# Patient Record
Sex: FEMALE | Race: WHITE | NOT HISPANIC OR LATINO
[De-identification: names, ages, dates, MRNs, and addresses within clinical notes are randomized per-mention and may not be internally consistent; named-entity substitution may affect disease eponyms.]

---

## 2018-04-27 ENCOUNTER — RESULT REVIEW (OUTPATIENT)
Age: 78
End: 2018-04-27

## 2018-04-30 PROBLEM — Z00.00 ENCOUNTER FOR PREVENTIVE HEALTH EXAMINATION: Status: ACTIVE | Noted: 2018-04-30

## 2018-05-15 ENCOUNTER — RECORD ABSTRACTING (OUTPATIENT)
Age: 78
End: 2018-05-15

## 2018-05-15 DIAGNOSIS — R93.8 ABNORMAL FINDINGS ON DIAGNOSTIC IMAGING OF OTHER SPECIFIED BODY STRUCTURES: ICD-10-CM

## 2018-05-15 DIAGNOSIS — Z86.79 PERSONAL HISTORY OF OTHER DISEASES OF THE CIRCULATORY SYSTEM: ICD-10-CM

## 2018-05-15 DIAGNOSIS — Z87.898 PERSONAL HISTORY OF OTHER SPECIFIED CONDITIONS: ICD-10-CM

## 2018-05-15 DIAGNOSIS — Z78.9 OTHER SPECIFIED HEALTH STATUS: ICD-10-CM

## 2018-05-15 DIAGNOSIS — Z85.3 PERSONAL HISTORY OF MALIGNANT NEOPLASM OF BREAST: ICD-10-CM

## 2018-05-15 RX ORDER — LISINOPRIL 20 MG/1
20 TABLET ORAL DAILY
Refills: 0 | Status: ACTIVE | COMMUNITY

## 2018-05-25 ENCOUNTER — APPOINTMENT (OUTPATIENT)
Dept: BREAST CENTER | Facility: HOSPITAL | Age: 78
End: 2018-05-25

## 2018-07-19 ENCOUNTER — APPOINTMENT (OUTPATIENT)
Dept: BREAST CENTER | Facility: CLINIC | Age: 78
End: 2018-07-19
Payer: MEDICARE

## 2018-07-19 VITALS
DIASTOLIC BLOOD PRESSURE: 89 MMHG | HEART RATE: 58 BPM | SYSTOLIC BLOOD PRESSURE: 147 MMHG | WEIGHT: 250 LBS | BODY MASS INDEX: 37.03 KG/M2 | HEIGHT: 69 IN

## 2018-07-19 PROCEDURE — 99214 OFFICE O/P EST MOD 30 MIN: CPT

## 2018-07-19 RX ORDER — METFORMIN HYDROCHLORIDE 500 MG/1
500 TABLET, COATED ORAL
Refills: 0 | Status: ACTIVE | COMMUNITY

## 2018-07-19 RX ORDER — DILTIAZEM HYDROCHLORIDE 360 MG/1
360 CAPSULE, EXTENDED RELEASE ORAL
Refills: 0 | Status: ACTIVE | COMMUNITY

## 2018-07-19 RX ORDER — APIXABAN 5 MG/1
5 TABLET, FILM COATED ORAL
Refills: 0 | Status: ACTIVE | COMMUNITY

## 2018-07-19 RX ORDER — ROSUVASTATIN CALCIUM 20 MG/1
20 TABLET, FILM COATED ORAL
Refills: 0 | Status: ACTIVE | COMMUNITY

## 2018-11-02 ENCOUNTER — APPOINTMENT (OUTPATIENT)
Dept: BREAST CENTER | Facility: HOSPITAL | Age: 78
End: 2018-11-02
Payer: MEDICARE

## 2018-11-02 PROCEDURE — 19301 PARTIAL MASTECTOMY: CPT | Mod: LT

## 2018-11-08 ENCOUNTER — APPOINTMENT (OUTPATIENT)
Dept: BREAST CENTER | Facility: CLINIC | Age: 78
End: 2018-11-08
Payer: MEDICARE

## 2018-11-08 DIAGNOSIS — Z12.31 ENCOUNTER FOR SCREENING MAMMOGRAM FOR MALIGNANT NEOPLASM OF BREAST: ICD-10-CM

## 2018-11-08 PROCEDURE — 99024 POSTOP FOLLOW-UP VISIT: CPT

## 2019-05-16 ENCOUNTER — APPOINTMENT (OUTPATIENT)
Dept: BREAST CENTER | Facility: CLINIC | Age: 79
End: 2019-05-16
Payer: MEDICARE

## 2019-05-16 VITALS
HEART RATE: 86 BPM | WEIGHT: 245 LBS | SYSTOLIC BLOOD PRESSURE: 151 MMHG | BODY MASS INDEX: 36.29 KG/M2 | DIASTOLIC BLOOD PRESSURE: 67 MMHG | HEIGHT: 69 IN

## 2019-05-16 DIAGNOSIS — Z87.898 PERSONAL HISTORY OF OTHER SPECIFIED CONDITIONS: ICD-10-CM

## 2019-05-16 PROCEDURE — 99214 OFFICE O/P EST MOD 30 MIN: CPT

## 2019-05-16 RX ORDER — AMLODIPINE BESYLATE 5 MG/1
5 TABLET ORAL DAILY
Refills: 0 | Status: DISCONTINUED | COMMUNITY
End: 2019-05-16

## 2019-05-16 RX ORDER — ASPIRIN 81 MG/1
81 TABLET, CHEWABLE ORAL DAILY
Refills: 0 | Status: DISCONTINUED | COMMUNITY
End: 2019-05-16

## 2019-05-16 NOTE — HISTORY OF PRESENT ILLNESS
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 ROS reviewed/discussed. Taking Vit D. Last Bone Densitometry (2/14): WNL\par Mammo/Sono (3/26/18): +7mm superf nodule L 1:00 N8 > Bx rec'ed

## 2019-05-16 NOTE — PHYSICAL EXAM
[Normocephalic] : normocephalic [Atraumatic] : atraumatic [Supple] : supple [No Supraclavicular Adenopathy] : no supraclavicular adenopathy [No Cervical Adenopathy] : no cervical adenopathy [Examined in the supine and seated position] : examined in the supine and seated position [No Thyromegaly] : no thyromegaly [No dominant masses] : no dominant masses in right breast  [No dominant masses] : no dominant masses left breast [No Nipple Retraction] : no left nipple retraction [No Nipple Discharge] : no left nipple discharge [No Axillary Lymphadenopathy] : no left axillary lymphadenopathy [No Edema] : no edema [No Rashes] : no rashes [No Ulceration] : no ulceration [de-identified] : PMX/SLN/RT. NER. %. No lymphedema. \par +stable retraction R LOQ [de-identified] : +A. Fib [de-identified] : PMX/Skin Tumor Exc/Ax/RT. NER. %. No lymphedema.

## 2019-05-16 NOTE — REVIEW OF SYSTEMS
[SOB on Exertion] : shortness of breath during exertion [Negative] : Heme/Lymph [FreeTextEntry5] : + A. Fib

## 2019-11-06 ENCOUNTER — RECORD ABSTRACTING (OUTPATIENT)
Age: 79
End: 2019-11-06

## 2019-11-06 DIAGNOSIS — Z92.21 PERSONAL HISTORY OF ANTINEOPLASTIC CHEMOTHERAPY: ICD-10-CM

## 2019-11-06 DIAGNOSIS — Z86.39 PERSONAL HISTORY OF OTHER ENDOCRINE, NUTRITIONAL AND METABOLIC DISEASE: ICD-10-CM

## 2019-11-25 ENCOUNTER — APPOINTMENT (OUTPATIENT)
Dept: RADIATION ONCOLOGY | Facility: CLINIC | Age: 79
End: 2019-11-25
Payer: MEDICARE

## 2019-11-25 PROCEDURE — 99213 OFFICE O/P EST LOW 20 MIN: CPT

## 2019-11-27 NOTE — PHYSICAL EXAM
[FreeTextEntry1] : This is a well developed, well nourished woman in no acute distress. She is awake, alert and oriented x3.  The patient appears stated age.  The skin in the previously irradiated area on the left cheek is smooth and well healed. THere is no nodularity or scaling, no evidence for residual or recurrent disease. The left nasal ala is notable for a 2-3 mm area of erythema, which may be the area of recent biopsy. There is no cervical or supraclavicular adenopathy bilaterally.\par

## 2019-11-27 NOTE — DATA REVIEWED
[FreeTextEntry1] : PATHOLOGIC DATA:\par Case No.: [id number] [facility] of Dermatopathology [facility]:  Left post nasal ala: Basal cell carcinoma

## 2019-11-27 NOTE — DISEASE MANAGEMENT
[Pathological] : TNM Stage: p [I] : I [FreeTextEntry4] : Basal cell carcinoma of the left upper cheek [TTNM] : 1 [NTNM] : 0 [MTNM] : 0 [de-identified] : 5500 cGy to the left medial cheek completed on 07/12/2019

## 2019-11-27 NOTE — HISTORY OF PRESENT ILLNESS
[FreeTextEntry1] : Since last being seen in our office for routine follow-up in July, Ms Woo has no new complaints regarding the previously irradiated area of her left cheek.   She mentions that she had a small lesion on her left nasal ala biopsied in September.  She was asked to see us to discuss radiotherapy to the area.  She is reluctant to commit to daily treatment and she would like to discuss this further with her dermatologist prior to discussing it here.  She reports that she is scheduled to see Dr Bland on or about December 10, 2019 for a follow-up.

## 2019-12-05 ENCOUNTER — APPOINTMENT (OUTPATIENT)
Dept: BREAST CENTER | Facility: CLINIC | Age: 79
End: 2019-12-05
Payer: MEDICARE

## 2019-12-05 VITALS
DIASTOLIC BLOOD PRESSURE: 77 MMHG | WEIGHT: 246 LBS | HEART RATE: 64 BPM | BODY MASS INDEX: 36.43 KG/M2 | SYSTOLIC BLOOD PRESSURE: 141 MMHG | HEIGHT: 69 IN

## 2019-12-05 PROCEDURE — 99214 OFFICE O/P EST MOD 30 MIN: CPT

## 2019-12-05 RX ORDER — LISINOPRIL 20 MG/1
20 TABLET ORAL
Refills: 0 | Status: DISCONTINUED | COMMUNITY
End: 2019-12-05

## 2019-12-05 RX ORDER — APIXABAN 5 MG/1
5 TABLET, FILM COATED ORAL
Refills: 0 | Status: DISCONTINUED | COMMUNITY
End: 2019-12-05

## 2019-12-05 RX ORDER — ROSUVASTATIN CALCIUM 20 MG/1
20 TABLET, FILM COATED ORAL DAILY
Refills: 0 | Status: DISCONTINUED | COMMUNITY
End: 2019-12-05

## 2019-12-05 RX ORDER — CHOLECALCIFEROL (VITAMIN D3) 25 MCG
TABLET ORAL
Refills: 0 | Status: DISCONTINUED | COMMUNITY
End: 2019-12-05

## 2019-12-05 NOTE — HISTORY OF PRESENT ILLNESS
[FreeTextEntry1] : S/P Wide Exc Skin Tumor (11/2/18): +3mm resid adnexal skin tumor, -margins\par S/P L Sono Core BX (4/20/18): +prob sweat gland tumor > comlete exc rec'ed\par S/P R PMX w/NL/SLN (10/30/06): +MF ILCA/LCIS, -0/1 LN, ER+, [place]+, Her 2 -\par R Stage IA ILCA\par S/P R Re-Exc (11/17/06): No resid Ca\par S/P R RT\par S/P Arimidex x 6 yrs\par S/P L PMX/AX/RT (1993, Abhijit)\par S/P tmx x 5 yrs\par S/P R Sono Core Bx (R 1:00, N3)(3/14/14): prob scar\par On eliquis for A. Fib\par No MH/FH changes. ROS reviewed/discussed. Taking Vit D. Last Bone Densitometry (2/14): WNL\par Mammo/Sono (3/27/19): FG, NSF

## 2019-12-05 NOTE — PHYSICAL EXAM
[Normocephalic] : normocephalic [No Supraclavicular Adenopathy] : no supraclavicular adenopathy [Supple] : supple [Atraumatic] : atraumatic [Normal Sinus Rhythm] : normal sinus rhythm [No Cervical Adenopathy] : no cervical adenopathy [No Thyromegaly] : no thyromegaly [Examined in the supine and seated position] : examined in the supine and seated position [No dominant masses] : no dominant masses in right breast  [No Nipple Retraction] : no left nipple retraction [No dominant masses] : no dominant masses left breast [No Nipple Discharge] : no left nipple discharge [No Axillary Lymphadenopathy] : no left axillary lymphadenopathy [No Rashes] : no rashes [No Edema] : no edema [No Ulceration] : no ulceration [de-identified] : S/P PMX/SLN/RT. NER. %. No lymphedema. \par +sig retraction R LOQ > stable [de-identified] : S/P PMX/Skin Tumor Exc/Ax/RT. NER. %. No lymphedema.

## 2020-04-27 ENCOUNTER — APPOINTMENT (OUTPATIENT)
Dept: RADIATION ONCOLOGY | Facility: CLINIC | Age: 80
End: 2020-04-27

## 2020-06-03 ENCOUNTER — APPOINTMENT (OUTPATIENT)
Dept: BREAST CENTER | Facility: CLINIC | Age: 80
End: 2020-06-03
Payer: MEDICARE

## 2020-06-03 VITALS — WEIGHT: 250 LBS | BODY MASS INDEX: 37.03 KG/M2 | HEIGHT: 69 IN

## 2020-06-03 PROCEDURE — 99214 OFFICE O/P EST MOD 30 MIN: CPT

## 2020-06-03 NOTE — PHYSICAL EXAM
[Normocephalic] : normocephalic [Atraumatic] : atraumatic [No Supraclavicular Adenopathy] : no supraclavicular adenopathy [Supple] : supple [No Cervical Adenopathy] : no cervical adenopathy [No Thyromegaly] : no thyromegaly [Normal Sinus Rhythm] : normal sinus rhythm [Examined in the supine and seated position] : examined in the supine and seated position [No dominant masses] : no dominant masses in right breast  [No dominant masses] : no dominant masses left breast [No Nipple Retraction] : no left nipple retraction [No Nipple Discharge] : no left nipple discharge [No Axillary Lymphadenopathy] : no left axillary lymphadenopathy [No Edema] : no edema [No Rashes] : no rashes [No Ulceration] : no ulceration [de-identified] : S/P PMX/Exc/Ax/RT. NER. %. No lymphedema.  [de-identified] : S/P PMX/SLN/RT. NER. %. No lymphedema.

## 2020-06-03 NOTE — HISTORY OF PRESENT ILLNESS
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 Fib\par No MH/FH changes. ROS reviewed/discussed. Taking Vit D. Last Bone Densitometry (2/14): WNL\par Mammo/Sono (3/27/19): FG, NSF

## 2020-12-02 ENCOUNTER — APPOINTMENT (OUTPATIENT)
Dept: BREAST CENTER | Facility: CLINIC | Age: 80
End: 2020-12-02
Payer: MEDICARE

## 2020-12-02 VITALS
HEIGHT: 69 IN | DIASTOLIC BLOOD PRESSURE: 78 MMHG | BODY MASS INDEX: 37.03 KG/M2 | HEART RATE: 71 BPM | WEIGHT: 250 LBS | SYSTOLIC BLOOD PRESSURE: 137 MMHG

## 2020-12-02 PROCEDURE — 99214 OFFICE O/P EST MOD 30 MIN: CPT

## 2020-12-02 PROCEDURE — 99072 ADDL SUPL MATRL&STAF TM PHE: CPT

## 2020-12-02 NOTE — HISTORY OF PRESENT ILLNESS
[FreeTextEntry1] : S/P Wide Exc Skin Tumor (11/2/18): +3mm resid adnexal skin tumor, -margins\par S/P L Sono Core BX (4/20/18): +prob sweat gland tumor > comlete exc rec'ed\par S/P R PMX w/NL/SLN (10/30/06): +MF ILCA/LCIS, -0/1 LN, ER+, [place]+, Her 2 -\par R Stage IA ILCA\par S/P R Re-Exc (11/17/06): No resid Ca\par S/P R RT\par S/P Arimidex x 6 yrs\par S/P L PMX/AX/RT (1993, Abhijit)\par S/P tmx x 5 yrs\par S/P R Sono Core Bx (R 1:00, N3)(3/14/14): prob scar\par On eliquis for A. Fib\par No MH/FH changes. ROS reviewed/discussed. Taking Vit D. Last Bone Densitometry (2/14): WNL\par Mammo/Sono (6/15/20): FG, NSF

## 2020-12-02 NOTE — PHYSICAL EXAM
[Normocephalic] : normocephalic [Atraumatic] : atraumatic [Supple] : supple [No Supraclavicular Adenopathy] : no supraclavicular adenopathy [No Cervical Adenopathy] : no cervical adenopathy [No Thyromegaly] : no thyromegaly [Examined in the supine and seated position] : examined in the supine and seated position [No dominant masses] : no dominant masses in right breast  [No dominant masses] : no dominant masses left breast [No Nipple Retraction] : no left nipple retraction [No Nipple Discharge] : no left nipple discharge [No Axillary Lymphadenopathy] : no left axillary lymphadenopathy [No Edema] : no edema [No Rashes] : no rashes [No Ulceration] : no ulceration [de-identified] : Yvonne KHAN s/t A. Fib

## 2021-07-08 ENCOUNTER — APPOINTMENT (OUTPATIENT)
Dept: BREAST CENTER | Facility: CLINIC | Age: 81
End: 2021-07-08
Payer: MEDICARE

## 2021-07-08 VITALS
WEIGHT: 240 LBS | HEART RATE: 84 BPM | DIASTOLIC BLOOD PRESSURE: 78 MMHG | BODY MASS INDEX: 35.55 KG/M2 | SYSTOLIC BLOOD PRESSURE: 158 MMHG | HEIGHT: 69 IN

## 2021-07-08 PROCEDURE — 99072 ADDL SUPL MATRL&STAF TM PHE: CPT

## 2021-07-08 PROCEDURE — 99214 OFFICE O/P EST MOD 30 MIN: CPT

## 2021-07-08 NOTE — HISTORY OF PRESENT ILLNESS
[FreeTextEntry1] : S/P Wide Exc Skin Tumor (11/2/18): +3mm resid adnexal skin tumor, -margins\par S/P L Sono Core BX (4/20/18): +prob sweat gland tumor > comlete exc rec'ed\par S/P R PMX w/NL/SLN (10/30/06): +MF ILCA/LCIS, -0/1 LN, ER+, [place]+, Her 2 -\par R Stage IA ILCA\par S/P R Re-Exc (11/17/06): No resid Ca\par S/P R RT\par S/P Arimidex x 6 yrs\par S/P L PMX/AX/RT (1993, Abhijit)\par S/P tmx x 5 yrs\par S/P R Sono Core Bx (R 1:00, N3)(3/14/14): prob scar\par On eliquis for A. Fib\par Got second Pfizer (3/21)(RUE)\par No MH/FH changes. ROS reviewed/discussed. Taking Vit D. Last Bone Densitometry (2/14): WNL\par Mammo/Sono (6/17/21): FG, NSF

## 2021-07-08 NOTE — PHYSICAL EXAM
[Normocephalic] : normocephalic [Atraumatic] : atraumatic [Supple] : supple [No Supraclavicular Adenopathy] : no supraclavicular adenopathy [No Cervical Adenopathy] : no cervical adenopathy [No Thyromegaly] : no thyromegaly [Normal Sinus Rhythm] : normal sinus rhythm [Examined in the supine and seated position] : examined in the supine and seated position [No dominant masses] : no dominant masses in right breast  [No dominant masses] : no dominant masses left breast [No Nipple Retraction] : no left nipple retraction [No Nipple Discharge] : no left nipple discharge [No Axillary Lymphadenopathy] : no left axillary lymphadenopathy [No Edema] : no edema [No Rashes] : no rashes [No Ulceration] : no ulceration [de-identified] : S/P PMX/SLN/RT. NER. %. No lymphedema.  [de-identified] : S/P PMX/Ax/RT/Exc Skin Tumor. NER. %. No lymphedema.

## 2022-01-19 ENCOUNTER — APPOINTMENT (OUTPATIENT)
Dept: BREAST CENTER | Facility: CLINIC | Age: 82
End: 2022-01-19
Payer: MEDICARE

## 2022-01-19 ENCOUNTER — NON-APPOINTMENT (OUTPATIENT)
Age: 82
End: 2022-01-19

## 2022-01-19 VITALS
HEART RATE: 84 BPM | BODY MASS INDEX: 34.07 KG/M2 | SYSTOLIC BLOOD PRESSURE: 134 MMHG | HEIGHT: 69 IN | WEIGHT: 230 LBS | DIASTOLIC BLOOD PRESSURE: 85 MMHG

## 2022-01-19 PROCEDURE — 99214 OFFICE O/P EST MOD 30 MIN: CPT

## 2022-01-19 NOTE — PHYSICAL EXAM
[Normocephalic] : normocephalic [Atraumatic] : atraumatic [Supple] : supple [No Supraclavicular Adenopathy] : no supraclavicular adenopathy [No Cervical Adenopathy] : no cervical adenopathy [No Thyromegaly] : no thyromegaly [Normal Sinus Rhythm] : normal sinus rhythm [Examined in the supine and seated position] : examined in the supine and seated position [No dominant masses] : no dominant masses in right breast  [No dominant masses] : no dominant masses left breast [No Nipple Retraction] : no left nipple retraction [No Nipple Discharge] : no left nipple discharge [No Axillary Lymphadenopathy] : no left axillary lymphadenopathy [No Edema] : no edema [No Rashes] : no rashes [No Ulceration] : no ulceration [de-identified] : S/P PMX/SLN/RT. NER. %. No lymphedema. \par +stable retraction/induration R LOQ c/w fat necrosis > observe [de-identified] : S/P PMX/Ax/Exc Skin Tumor/RT. NER. %. No lymphedema.

## 2022-01-19 NOTE — HISTORY OF PRESENT ILLNESS
[FreeTextEntry1] : S/P Wide Exc Skin Tumor (11/2/18): +3mm resid adnexal skin tumor, -margins\par S/P L Sono Core BX (4/20/18): +prob sweat gland tumor > comlete exc rec'ed\par S/P R PMX w/NL/SLN (10/30/06): +MF ILCA/LCIS, -0/1 LN, ER+, [place]+, Her 2 -\par R Stage IA ILCA\par S/P R Re-Exc (11/17/06): No resid Ca\par S/P R RT\par S/P Arimidex x 6 yrs\par S/P L PMX/AX/RT (1993, Abhijit)\par S/P tmx x 5 yrs\par S/P R Sono Core Bx (R 1:00, N3)(3/14/14): prob scar\par On eliquis for A. Fib\par Got Pfizer booster (10/21)(BETO)\par No MH/FH changes. ROS reviewed/discussed. Taking Vit D. Last Bone Densitometry (2/14): WNL\par Mammo/Sono (6/17/21): FG, NSF

## 2022-07-26 ENCOUNTER — APPOINTMENT (OUTPATIENT)
Dept: BREAST CENTER | Facility: CLINIC | Age: 82
End: 2022-07-26

## 2022-11-29 ENCOUNTER — APPOINTMENT (OUTPATIENT)
Dept: BREAST CENTER | Facility: CLINIC | Age: 82
End: 2022-11-29

## 2022-11-29 VITALS
HEART RATE: 81 BPM | DIASTOLIC BLOOD PRESSURE: 74 MMHG | WEIGHT: 225 LBS | SYSTOLIC BLOOD PRESSURE: 161 MMHG | BODY MASS INDEX: 33.33 KG/M2 | HEIGHT: 69 IN

## 2022-11-29 PROCEDURE — 99214 OFFICE O/P EST MOD 30 MIN: CPT

## 2022-11-29 NOTE — PHYSICAL EXAM
[Normocephalic] : normocephalic [Atraumatic] : atraumatic [Supple] : supple [No Supraclavicular Adenopathy] : no supraclavicular adenopathy [No Cervical Adenopathy] : no cervical adenopathy [No Thyromegaly] : no thyromegaly [Normal Sinus Rhythm] : normal sinus rhythm [Examined in the supine and seated position] : examined in the supine and seated position [No dominant masses] : no dominant masses in right breast  [No dominant masses] : no dominant masses left breast [No Nipple Retraction] : no left nipple retraction [No Nipple Discharge] : no left nipple discharge [No Axillary Lymphadenopathy] : no left axillary lymphadenopathy [No Edema] : no edema [No Rashes] : no rashes [No Ulceration] : no ulceration [de-identified] : S/P PMX/SLN/RT. NER. %. No lymphedema. \par +stable R LOQ induration/retraction c/w fat necrosis [de-identified] : S/P PMX/Ax/RT. NER. %. No lymphedema.

## 2022-11-29 NOTE — HISTORY OF PRESENT ILLNESS
[FreeTextEntry1] : S/P Wide Exc Skin Tumor (11/2/18): +3mm resid adnexal skin tumor, -margins\par S/P L Sono Core BX (4/20/18): +prob sweat gland tumor > comlete exc rec'ed\par S/P R PMX w/NL/SLN (10/30/06): +MF ILCA/LCIS, -0/1 LN, +margins, ER+, [place]+, Her 2 -\par R Stage IA ILCA\par S/P R Re-Exc (11/17/06): No resid Ca\par S/P R RT\par S/P Arimidex x 6 yrs\par S/P L PMX/AX/RT (1993, Abhijit)\par S/P tmx x 5 yrs\par S/P R Sono Core Bx (R 1:00, N3)(3/14/14): prob scar\par On eliquis for A. Fib\par Got Pfizer booster (10/21)(BETO)\par No MH/FH changes. ROS reviewed/discussed. Taking Vit D. Last Bone Densitometry (2/14): WNL\par Mammo/Sono (6/20/22): FG, NSF

## 2023-06-20 ENCOUNTER — RESULT REVIEW (OUTPATIENT)
Age: 83
End: 2023-06-20

## 2023-06-22 DIAGNOSIS — N63.20 UNSPECIFIED LUMP IN THE LEFT BREAST, UNSPECIFIED QUADRANT: ICD-10-CM

## 2023-07-05 ENCOUNTER — RESULT REVIEW (OUTPATIENT)
Age: 83
End: 2023-07-05

## 2023-07-18 ENCOUNTER — APPOINTMENT (OUTPATIENT)
Dept: BREAST CENTER | Facility: CLINIC | Age: 83
End: 2023-07-18
Payer: MEDICARE

## 2023-07-18 VITALS
WEIGHT: 210 LBS | HEIGHT: 69 IN | SYSTOLIC BLOOD PRESSURE: 121 MMHG | DIASTOLIC BLOOD PRESSURE: 65 MMHG | BODY MASS INDEX: 31.1 KG/M2 | HEART RATE: 80 BPM

## 2023-07-18 DIAGNOSIS — Z86.79 PERSONAL HISTORY OF OTHER DISEASES OF THE CIRCULATORY SYSTEM: ICD-10-CM

## 2023-07-18 DIAGNOSIS — Z85.3 PERSONAL HISTORY OF MALIGNANT NEOPLASM OF BREAST: ICD-10-CM

## 2023-07-18 DIAGNOSIS — R92.1 MAMMOGRAPHIC CALCIFICATION FOUND ON DIAGNOSTIC IMAGING OF BREAST: ICD-10-CM

## 2023-07-18 DIAGNOSIS — Z79.811 LONG TERM (CURRENT) USE OF AROMATASE INHIBITORS: ICD-10-CM

## 2023-07-18 PROCEDURE — 99214 OFFICE O/P EST MOD 30 MIN: CPT

## 2023-07-18 NOTE — PHYSICAL EXAM
[Normocephalic] : normocephalic [Atraumatic] : atraumatic [Supple] : supple [No Supraclavicular Adenopathy] : no supraclavicular adenopathy [No Cervical Adenopathy] : no cervical adenopathy [No Thyromegaly] : no thyromegaly [Normal Sinus Rhythm] : normal sinus rhythm [Examined in the supine and seated position] : examined in the supine and seated position [No dominant masses] : no dominant masses in right breast  [No dominant masses] : no dominant masses left breast [No Nipple Retraction] : no left nipple retraction [No Nipple Discharge] : no left nipple discharge [No Axillary Lymphadenopathy] : no left axillary lymphadenopathy [No Edema] : no edema [No Rashes] : no rashes [No Ulceration] : no ulceration [de-identified] : S/P PMX/SLN/RT. NER. %. No lymphedema. \par +stable retraction [de-identified] : S/P PMX/Ax/RT. NER. %. No lymphedema. \par +focal ecchymosis L LQI/L 3:00 c/t Bx sites

## 2023-07-18 NOTE — HISTORY OF PRESENT ILLNESS
[FreeTextEntry1] : S/P L Wide Exc Skin Tumor (11/2/18): +3mm resid adnexal skin tumor, -margins\par S/P L Sono Core BX (4/20/18): +prob sweat gland tumor > comlete exc rec'ed\par S/P R PMX w/NL/SLN (10/30/06): +MF ILCA/LCIS, -0/1 LN, +margins, ER+, [place]+, Her 2 -\par R Stage IA ILCA\par S/P R Re-Exc (11/17/06): No resid Ca\par S/P R RT\par S/P Arimidex x 6 yrs\par S/P L Sono Core Bx (7/6/23)(L 3:00 RA): +Inv Ca (ductal/lob features), SBR II, ER+, [place]+, Her2 2+, FISH: NON-Ampl, Ki67: 15-20%, +CIS (ductal/lob): high grade\par S/P L Stereot Bx (L LIQ)(7/6/23): Benign calc's\par S/P L PMX/AX/RT (1993, Abhijit)\par S/P tmx x 5 yrs\par S/P R Sono Core Bx (R 1:00, N3)(3/14/14): prob scar\par On eliquis for A. Fib\par Got Pfizer booster (10/21)(LUE)\par No MH/FH changes. ROS reviewed/discussed. Taking Vit D. Last Bone Densitometry (2/14): WNL\par Mammo/Sono (6/21/23): FG, +1cm susp mass L 3:00 RA, +pleom calc's L UOQ and L LIQ > Bx of L LIQ rec'ed

## 2023-08-17 ENCOUNTER — RESULT REVIEW (OUTPATIENT)
Age: 83
End: 2023-08-17

## 2023-08-18 ENCOUNTER — TRANSCRIPTION ENCOUNTER (OUTPATIENT)
Age: 83
End: 2023-08-18

## 2023-08-18 ENCOUNTER — RESULT REVIEW (OUTPATIENT)
Age: 83
End: 2023-08-18

## 2023-08-18 ENCOUNTER — APPOINTMENT (OUTPATIENT)
Dept: BREAST CENTER | Facility: HOSPITAL | Age: 83
End: 2023-08-18

## 2023-08-24 ENCOUNTER — APPOINTMENT (OUTPATIENT)
Dept: BREAST CENTER | Facility: CLINIC | Age: 83
End: 2023-08-24
Payer: MEDICARE

## 2023-08-24 PROCEDURE — 99024 POSTOP FOLLOW-UP VISIT: CPT

## 2023-08-24 NOTE — HISTORY OF PRESENT ILLNESS
[FreeTextEntry1] : S/P L PMX w/ NL (8/18/23): +1.1cm Ca w/ Ductal/Lob features, SBR III, +DCIS int/high grade, -LVI, -margins, ER+, [place]+, Her2 2+, FISH-, Ki67: 15-20% S/P L Wide Exc Skin Tumor (11/2/18): +3mm resid adnexal skin tumor, -margins S/P L Sono Core BX (4/20/18): +prob sweat gland tumor > comlete exc rec'ed S/P R PMX w/NL/SLN (10/30/06): +MF ILCA/LCIS, -0/1 LN, +margins, ER+, [place]+, Her 2 - R Stage IA ILCA S/P R Re-Exc (11/17/06): No resid Ca S/P R RT S/P Arimidex x 6 yrs S/P L Sono Core Bx (7/6/23)(L 3:00 RA): +Inv Ca (ductal/lob features), SBR II, ER+, [place]+, Her2 2+, FISH: NON-Ampl, Ki67: 15-20%, +CIS (ductal/lob): high grade S/P L Stereot Bx (L LIQ)(7/6/23): Benign calc's S/P L PMX/AX/RT (1993, Abhijit) S/P tmx x 5 yrs S/P R Sono Core Bx (R 1:00, N3)(3/14/14): prob scar On eliquis for A. Fib Got Pfizer booster (10/21)(LUE) No MH/FH changes. ROS reviewed/discussed. Taking Vit D. Last Bone Densitometry (2/14): WNL Mammo/Sono (6/21/23): FG, +1cm susp mass L 3:00 RA, +pleom calc's L UOQ and L LIQ > Bx of L LIQ rec'ed

## 2023-08-24 NOTE — PHYSICAL EXAM
[de-identified] : Pt w/o c/o Incision C&D. Min induration/ecchymosis. PDS d/c'ed. Stable. Pathology discussed.

## 2023-09-02 ENCOUNTER — NON-APPOINTMENT (OUTPATIENT)
Age: 83
End: 2023-09-02

## 2023-09-05 ENCOUNTER — RESULT REVIEW (OUTPATIENT)
Age: 83
End: 2023-09-05

## 2023-09-05 ENCOUNTER — APPOINTMENT (OUTPATIENT)
Dept: HEMATOLOGY ONCOLOGY | Facility: CLINIC | Age: 83
End: 2023-09-05
Payer: MEDICARE

## 2023-09-05 VITALS
BODY MASS INDEX: 33.43 KG/M2 | OXYGEN SATURATION: 92 % | DIASTOLIC BLOOD PRESSURE: 67 MMHG | HEART RATE: 96 BPM | WEIGHT: 213 LBS | TEMPERATURE: 97.8 F | SYSTOLIC BLOOD PRESSURE: 182 MMHG | RESPIRATION RATE: 18 BRPM | HEIGHT: 67 IN

## 2023-09-05 DIAGNOSIS — Z78.0 ASYMPTOMATIC MENOPAUSAL STATE: ICD-10-CM

## 2023-09-05 PROCEDURE — 99205 OFFICE O/P NEW HI 60 MIN: CPT | Mod: 25

## 2023-09-05 NOTE — CONSULT LETTER
[Dear  ___] : Dear  [unfilled], [Consult Letter:] : I had the pleasure of evaluating your patient, [unfilled]. [Please see my note below.] : Please see my note below. [Consult Closing:] : Thank you very much for allowing me to participate in the care of this patient.  If you have any questions, please do not hesitate to contact me. [Sincerely,] : Sincerely, [FreeTextEntry3] : Mis Epperson DO, FACJOSÉ MANUEL, FACP Medical Oncology and Hematology [facility] [DrBerto  ___] : Dr. EAGLE

## 2023-09-05 NOTE — PHYSICAL EXAM
[Fully active, able to carry on all pre-disease performance without restriction] : Status 0 - Fully active, able to carry on all pre-disease performance without restriction [Normal] : affect appropriate [de-identified] : deferred - patient prefers Dr. Murray to examine

## 2023-09-05 NOTE — ASSESSMENT
[FreeTextEntry1] : #Invasive Carcinoma (ductal/lobular features), ER+, PA+, Her2 2+, FISH: NON-Amplified, Ki67: 15-20%,  Patient has extensive history: Diagnosed in 1993 with breast cancer s/p L partial maastectomy, axillary dissection and radiation. Received Tamoxifen for 5 years. In 2006 developed ILC, -0/1 LN, +margins, ER+, PA+, Her 2 -  s/p R PMX w SLN and needed re-excision due to positive margins. She received radiation and was on arimadex for 6 years. Now with Invasive Carcinoma (ductal/lobular features), ER+, [place]+, Her2 2+, FISH: NON-Amplified, Ki67: 15-20%,  s/p L lumpectomy  +1.1cm Ca w/ Ductal/lobular features, grade III -LVI, -margins, ER+, [place]+, Her2 2+, FISH-, Ki67: 15-20% discussed role of oncotype - she does not want chemo thus will no order would recommend evaluation by Rad onc to discuss role of RT - appt Thursday with Dr. Marin Recommend endocrine therapy to lower estrogen. Check DEXA  to see if there is severe osteoporosis. If not would consider AI, letrozole 2.5 mg daily. Reviewed side effects. If there is severe osteoporosis would recommend tamoxifen again as it has been 20 + years since she has received it and I do believe she would benefit from ET.  #menopause  #afib on dilitazem and eliquis RTC in Nov/Dec - cbc with diff, cmp, Vit D

## 2023-09-05 NOTE — HISTORY OF PRESENT ILLNESS
[de-identified] : Ms. Mata is an 83 year old female with a PMH of afib, BCCa, HTN, HLD, DM, LCIS, R breast IDC that presents for initial consultation referred by Dr. Murray for new L sided IDC.   Oncological History:   She has been followed by Dr. Murray for many years.    was diagnosed with L breast cancer followed by Dr. Lacey s/p L lumpectomy, s/p RT and 5 years of Tamoxifen    s/p R breast bx revealing multifocal LCIS with pagetoid extension into the terminal ducts    s/p L breast bx revealing non proliferative fibrocystic changes with micro calcium deposition within atrophic terminal ductules and benign lobules with no evidence for atypia or malignancy.    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  LN negatve. She was then taken back 2006 for wide excision of R breast with no residual carcinoma present LCIS present  She then completed RT and 6 years of Arimidex.   2018 surgical consultation from bx? with possible sweat gland differentiation   2018 L wide excision of skin tumor revealing eccrine origin, prominent adenoid and baseal oid features in tumor. Tumor completely circumscribed in dermins of the skin and measure 3mm in greatest dimension. All surgical lines of resection free of tumor with wide margins (>1.0cm)   2019 s/p exision for L post nasal ALA wiht BCCA s/p RT   2023 s/p mammo/sono revealing At 3:00 in the left retroareolar/periareolar region a new highly suspicious mass with associated color flow correlating with an irregular mammographic mass at 2-3 o'clock is present. Ultrasound-guided biopsy is necessary at the current time to determine histology. Fine pleomorphic microcalcifications are present at the upper outer aspect of the left breast and the inner lower aspect of the left breast. Representative stereotactic biopsy of the inner lower quadrant calcifications is recommended at the current time to determine histology and evaluate for malignancy with additional stereotactic biopsy of upper outer quadrant calcifications at a later date, if this would change clinical management.  2023 s/p L breast bx revealing invasive carcinoma with mixed ductal and lobular features involving up to 5 cores up to 11mm in single core, grade 7/9 with signet ring cells ER/AZ+ HER2 equivocal negative by FISH, Ki67 15-20%   2023 s/p L lumpectomy revealing Invasive carcinoma with ductal and lobular features, histologic grade 3 (tubules 3, nuclei 3, mitoses 2) spanning up to 11 mm in greatest dimension. Rare signet ring cell features are seen. Ductal carcinoma in situ (DCIS), intermediate to high nuclear grade, solid and cribriform pattern, minor component. Perineural invasion is seen. No angiolymphatic invasion seen. Invasive carcinoma is seen at the medial edge in multiple foci and is 4 mm from the anterior edge of the specimen. ER/AZ + HER2 negative Ki 67 15-20%   Breast Cancer Risk Factors: Menarche: 10 Date of LMP: age 43 Menopause: age 43  Age at first live birth: 30s Nursed: no Hysterectomy: no Oophorectomy: yes OCP: few months  HRT: no Last pap/pelvic exam: never abnl PAP, has not seen GYN in 10 years Related family history none  Ashkenazi: no BRCA testing: no

## 2023-09-07 ENCOUNTER — APPOINTMENT (OUTPATIENT)
Dept: RADIATION ONCOLOGY | Facility: CLINIC | Age: 83
End: 2023-09-07
Payer: MEDICARE

## 2023-09-07 VITALS
TEMPERATURE: 97 F | RESPIRATION RATE: 16 BRPM | OXYGEN SATURATION: 98 % | DIASTOLIC BLOOD PRESSURE: 71 MMHG | WEIGHT: 213 LBS | BODY MASS INDEX: 33.43 KG/M2 | HEART RATE: 71 BPM | SYSTOLIC BLOOD PRESSURE: 124 MMHG | HEIGHT: 67 IN

## 2023-09-07 DIAGNOSIS — Z78.9 OTHER SPECIFIED HEALTH STATUS: ICD-10-CM

## 2023-09-07 PROCEDURE — 99204 OFFICE O/P NEW MOD 45 MIN: CPT | Mod: 25

## 2023-09-07 NOTE — PHYSICAL EXAM
[Symmetric] : breasts are symmetric [Breast Palpation Mass] : no palpable masses [Breast Abnormal Lactation (Galactorrhea)] : no nipple discharge [No UE Edema] : there is no upper extremity edema [Normal] : oriented to person, place and time, the affect was normal, the mood was normal and not anxious [de-identified] : Well-healing lumpectomy scar on left side with no underlying induration.  No other lumps palpable.  No lymph node palpable in the left axilla.

## 2023-09-07 NOTE — HISTORY OF PRESENT ILLNESS
[FreeTextEntry1] : Ms. Mata is an 83-year-old female who presents today for a newly diagnosed Left Breast Cancer.  Oncological History: She has been followed by Dr. Murray for many years.  1993 was diagnosed with L breast cancer followed by Dr. Lacey s/p L lumpectomy, s/p RT ([facility]) and 5 years of Tamoxifen. 2000 s/p R breast bx revealing multifocal LCIS with pagetoid extension into the terminal ducts. 2004 s/p L breast bx revealing non proliferative fibrocystic changes with micro calcium deposition within atrophic terminal ductulus and benign lobules with no evidence for atypia or malignancy. 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 LN negative. She was then taken back 11/2006 for wide excision of R breast with no residual carcinoma present LCIS present.  She then completed RT  ( [place]) and 6 years of Arimidex.  11/2018 L wide excision of skin tumor, Left breast:  revealing eccrine origin, prominent adenoid and baseal oid features in tumor. Tumor completely circumscribed in dermis of the skin and measure 3mm in greatest dimension. All surgical lines of resection free of tumor with wide margins (>1.0cm)  9/2019 s/p excision for L postnasal ALA with BCCA . She did complete 5500 cGy to the left medial cheek on 7/12/2019.  6/21/2023 Mammo/Sono ([facility]) revealing at 3:00 in the left retro areolar/payal areolar region a new highly suspicious mass with associated color flow correlating with an irregular mammographic mass at 2-3 o'clock is present. Ultrasound-guided biopsy is necessary at the current time to determine histology. Fine pleomorphic microcalcifications are present at the upper outer aspect of the left breast and the inner lower aspect of the left breast. Representative stereotactic biopsy of the inner lower quadrant calcifications is recommended at the current time to determine histology and evaluate for malignancy with additional stereotactic biopsy of upper outer quadrant calcifications at a later date, if this would change clinical management.  7/5/2023 L breast bx revealing invasive carcinoma with mixed ductal and lobular features involving up to 5 cores up to 11mm in single core, grade 7/9 with signet ring cells ER/VT+ HER2 equivocal negative by FISH, Ki67 15-20%  8/18/2023 L lumpectomy revealing Invasive carcinoma with ductal and lobular features, histologic grade 3 (tubules 3, nuclei 3, mitoses 2) spanning up to 11 mm in greatest dimension. Rare signet ring cell features are seen. Ductal carcinoma in situ (DCIS), intermediate to high nuclear grade, solid and cribriform pattern, minor component. Perineural invasion is seen. No angiolymphatic invasion seen. Invasive carcinoma is seen at the medial edge in multiple foci and is 4 mm from the anterior edge of the specimen. ER/VT + HER2 negative Ki 67 15-20%  Sheis overall feeling well. She does have some left breast heaviness. She saw Dr. Epperson on 9/5/2023 adn she is ordering a BMD.

## 2023-09-07 NOTE — VITALS
[Maximal Pain Intensity: 0/10] : 0/10 [Least Pain Intensity: 0/10] : 0/10 [NoTreatment Scheduled] : no treatment scheduled [80: Normal activity with effort; some signs or symptoms of disease.] : 80: Normal activity with effort; some signs or symptoms of disease.  [Date: ____________] : Patient's last distress assessment performed on [unfilled]. [0 - No Distress] : Distress Level: 0

## 2023-09-12 ENCOUNTER — APPOINTMENT (OUTPATIENT)
Dept: BREAST CENTER | Facility: CLINIC | Age: 83
End: 2023-09-12
Payer: MEDICARE

## 2023-09-12 ENCOUNTER — RESULT REVIEW (OUTPATIENT)
Age: 83
End: 2023-09-12

## 2023-09-12 PROCEDURE — 19000 PUNCTURE ASPIR CYST BREAST: CPT | Mod: LT,78

## 2023-09-14 ENCOUNTER — APPOINTMENT (OUTPATIENT)
Dept: BREAST CENTER | Facility: CLINIC | Age: 83
End: 2023-09-14
Payer: MEDICARE

## 2023-09-14 PROCEDURE — 19020 MASTOTOMY EXPL DRG ABSC DP: CPT | Mod: LT,78

## 2023-09-18 ENCOUNTER — APPOINTMENT (OUTPATIENT)
Dept: BREAST CENTER | Facility: CLINIC | Age: 83
End: 2023-09-18
Payer: MEDICARE

## 2023-09-18 PROCEDURE — 99024 POSTOP FOLLOW-UP VISIT: CPT

## 2023-09-26 ENCOUNTER — APPOINTMENT (OUTPATIENT)
Dept: BREAST CENTER | Facility: HOSPITAL | Age: 83
End: 2023-09-26
Payer: MEDICARE

## 2023-09-26 PROCEDURE — 99024 POSTOP FOLLOW-UP VISIT: CPT

## 2023-10-03 ENCOUNTER — APPOINTMENT (OUTPATIENT)
Dept: BREAST CENTER | Facility: CLINIC | Age: 83
End: 2023-10-03
Payer: MEDICARE

## 2023-10-03 PROCEDURE — 99213 OFFICE O/P EST LOW 20 MIN: CPT | Mod: 24

## 2023-10-10 ENCOUNTER — APPOINTMENT (OUTPATIENT)
Dept: BREAST CENTER | Facility: CLINIC | Age: 83
End: 2023-10-10
Payer: MEDICARE

## 2023-10-10 PROCEDURE — 99024 POSTOP FOLLOW-UP VISIT: CPT

## 2023-11-13 ENCOUNTER — NON-APPOINTMENT (OUTPATIENT)
Age: 83
End: 2023-11-13

## 2023-11-13 VITALS
SYSTOLIC BLOOD PRESSURE: 155 MMHG | DIASTOLIC BLOOD PRESSURE: 77 MMHG | OXYGEN SATURATION: 96 % | RESPIRATION RATE: 16 BRPM | HEART RATE: 70 BPM

## 2023-11-28 ENCOUNTER — APPOINTMENT (OUTPATIENT)
Dept: HEMATOLOGY ONCOLOGY | Facility: CLINIC | Age: 83
End: 2023-11-28

## 2023-12-05 ENCOUNTER — APPOINTMENT (OUTPATIENT)
Dept: BREAST CENTER | Facility: CLINIC | Age: 83
End: 2023-12-05
Payer: MEDICARE

## 2023-12-05 VITALS
WEIGHT: 210 LBS | DIASTOLIC BLOOD PRESSURE: 92 MMHG | HEIGHT: 69 IN | HEART RATE: 66 BPM | SYSTOLIC BLOOD PRESSURE: 151 MMHG | BODY MASS INDEX: 31.1 KG/M2

## 2023-12-05 DIAGNOSIS — N61.1 ABSCESS OF THE BREAST AND NIPPLE: ICD-10-CM

## 2023-12-05 DIAGNOSIS — C44.319 BASAL CELL CARCINOMA OF SKIN OF OTHER PARTS OF FACE: ICD-10-CM

## 2023-12-05 PROCEDURE — 99214 OFFICE O/P EST MOD 30 MIN: CPT

## 2023-12-13 NOTE — CONSULT LETTER
[FreeTextEntry3] : Mis Epperson DO, FACJOSÉ MANUEL, FACP Medical Oncology and Hematology [facility]

## 2023-12-19 ENCOUNTER — RESULT REVIEW (OUTPATIENT)
Age: 83
End: 2023-12-19

## 2023-12-19 ENCOUNTER — APPOINTMENT (OUTPATIENT)
Dept: HEMATOLOGY ONCOLOGY | Facility: CLINIC | Age: 83
End: 2023-12-19
Payer: MEDICARE

## 2023-12-19 PROCEDURE — 99213 OFFICE O/P EST LOW 20 MIN: CPT | Mod: 25

## 2023-12-26 NOTE — HISTORY OF PRESENT ILLNESS
[de-identified] : Ms. Mata is an 83 year old female with a PMH of afib, BCCa, HTN, HLD, DM, LCIS, R breast IDC that presents for initial consultation referred by Dr. Murray for new L sided IDC.   Oncological History:   She has been followed by Dr. Murray for many years.    was diagnosed with L breast cancer followed by Dr. Lacey s/p L lumpectomy, s/p RT and 5 years of Tamoxifen    s/p R breast bx revealing multifocal LCIS with pagetoid extension into the terminal ducts    s/p L breast bx revealing non proliferative fibrocystic changes with micro calcium deposition within atrophic terminal ductules and benign lobules with no evidence for atypia or malignancy.    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  LN negatve. She was then taken back 2006 for wide excision of R breast with no residual carcinoma present LCIS present  She then completed RT and 6 years of Arimidex.   2018 surgical consultation from bx? with possible sweat gland differentiation   2018 L wide excision of skin tumor revealing eccrine origin, prominent adenoid and baseal oid features in tumor. Tumor completely circumscribed in dermins of the skin and measure 3mm in greatest dimension. All surgical lines of resection free of tumor with wide margins (>1.0cm)   2019 s/p exision for L post nasal ALA wiht BCCA s/p RT   2023 s/p mammo/sono revealing At 3:00 in the left retroareolar/periareolar region a new highly suspicious mass with associated color flow correlating with an irregular mammographic mass at 2-3 o'clock is present. Ultrasound-guided biopsy is necessary at the current time to determine histology. Fine pleomorphic microcalcifications are present at the upper outer aspect of the left breast and the inner lower aspect of the left breast. Representative stereotactic biopsy of the inner lower quadrant calcifications is recommended at the current time to determine histology and evaluate for malignancy with additional stereotactic biopsy of upper outer quadrant calcifications at a later date, if this would change clinical management.  2023 s/p L breast bx revealing invasive carcinoma with mixed ductal and lobular features involving up to 5 cores up to 11mm in single core, grade 7/9 with signet ring cells ER/MT+ HER2 equivocal negative by FISH, Ki67 15-20%   2023 s/p L lumpectomy revealing Invasive carcinoma with ductal and lobular features, histologic grade 3 (tubules 3, nuclei 3, mitoses 2) spanning up to 11 mm in greatest dimension. Rare signet ring cell features are seen. Ductal carcinoma in situ (DCIS), intermediate to high nuclear grade, solid and cribriform pattern, minor component. Perineural invasion is seen. No angiolymphatic invasion seen. Invasive carcinoma is seen at the medial edge in multiple foci and is 4 mm from the anterior edge of the specimen. ER/MT + HER2 negative Ki 67 15-20%   Breast Cancer Risk Factors: Menarche: 10 Date of LMP: age 43 Menopause: age 43  Age at first live birth: 30s Nursed: no Hysterectomy: no Oophorectomy: yes OCP: few months  HRT: no Last pap/pelvic exam: never abnl PAP, has not seen GYN in 10 years Related family history none  Ashkenazi: no BRCA testing: no   [de-identified] : Patient seen and examined today for routine follow up for the management of breast cancer. She is s/p completion of RT and has follow up 12/28/23. She started on Letrozole following RT on 11/28/23. She has been tolerating. She notes feeling fatigued. She has been taking Ca++ and vit D. She continues to have chronic low back pain s/p injections with Dr. Navarro. s/p follow up with Dr. Murray 12/2023. s/p DEXA 9/2023 revealing osteopenia.

## 2023-12-26 NOTE — ASSESSMENT
[FreeTextEntry1] : #Breast Cancer - Invasive Carcinoma (ductal/lobular features), ER+, CA+, Her2 2+, FISH: NON-Amplified, Ki67: 15-20%, Patient has extensive history: Diagnosed in 1993 with breast cancer s/p L partial maastectomy, axillary dissection and radiation. Received Tamoxifen for 5 years. In 2006 developed ILC, -0/1 LN, +margins, ER+, CA+, Her 2 - s/p R PMX w SLN and needed re-excision due to positive margins. She received radiation and was on arimadex for 6 years.  - Now with Invasive Carcinoma (ductal/lobular features), ER+, CA+, Her2 2+, FISH: NON-Amplified, Ki67: 15-20%, - s/p L lumpectomy +1.1cm Ca w/ Ductal/lobular features, grade III -LVI, -margins, ER+, CA+, Her2 2+, FISH-, Ki67: 15-20% - Discussed role of oncotype - she does not want chemo thus will no order - Would recommend evaluation by Rad onc to discuss role of RT - appt Thursday with Dr. Marin - Recommend endocrine therapy to lower estrogen. Check DEXA to see if there is severe osteoporosis. If not would consider AI, letrozole 2.5 mg daily. Reviewed side effects. If there is severe osteoporosis would recommend tamoxifen again as it has been 20 + years since she has received it and I do believe she would benefit from ET. - 12/19/23 vs and CBC reviewed; WBC 11.01, hgb 13.3, plt 252. s/p completion of RT. Has follow up with Rad Onc next week for PTE. s/p follow up with Dr. Murray 12/2023, note reviewed. Started Letrozole 11/28/23 tolerating well. Continue. Monitor leukocytosis. No s/s infection   #Back Pain  - Sees Dr. Navarro s/p injections   #Osteopenia  - s/p DEXA 9/2023 revealing L spine -0.2, L hi -1.3, L femoral necl -1.4, R hip -1.1, R femoral neck -1.0 - On Letrozole  - Ca++ and vit D   #Afib - On dilitazem and eliquis  RTC in 3 mos with CBC with diff, CMP, vit D

## 2023-12-28 ENCOUNTER — APPOINTMENT (OUTPATIENT)
Dept: RADIATION ONCOLOGY | Facility: CLINIC | Age: 83
End: 2023-12-28
Payer: MEDICARE

## 2023-12-28 VITALS
OXYGEN SATURATION: 98 % | RESPIRATION RATE: 16 BRPM | SYSTOLIC BLOOD PRESSURE: 140 MMHG | HEART RATE: 89 BPM | DIASTOLIC BLOOD PRESSURE: 72 MMHG | TEMPERATURE: 98 F

## 2023-12-28 PROCEDURE — 99024 POSTOP FOLLOW-UP VISIT: CPT

## 2023-12-28 RX ORDER — CEPHALEXIN 500 MG/1
500 CAPSULE ORAL 4 TIMES DAILY
Qty: 28 | Refills: 0 | Status: DISCONTINUED | COMMUNITY
Start: 2023-09-12 | End: 2023-12-28

## 2023-12-28 NOTE — DISEASE MANAGEMENT
[Pathological] : TNM Stage: p [I] : I [TTNM] : 1 [NTNM] : 0 [MTNM] : 0 [de-identified] : 5/5Fx to left breast to a dose of 30Gy on 11/8/2023.

## 2023-12-28 NOTE — HISTORY OF PRESENT ILLNESS
[FreeTextEntry1] :  Ms. DYE is a 83-year-old female diagnosed with Invasive carcinoma of the left breast. She has completed 5/5 fractions to a total dose of 1800 cGy on 11/8/2023.  12/28/23  PTE today.  She is now s/p RT to the Left partial breast 5 Fxs to 30 Gy finished on 11/8/23. She had a follow up with Dr. Murray in early December and will see him again in 3/2024 with breast imaging in 6/2024. She saw Dr. Epperson on 12/18/2023 and is taking Letrozole 2.5 mg daily with no side effects.  She is overall feeling well and denies any pain, skin issues or bone pain.  .

## 2023-12-28 NOTE — REASON FOR VISIT
[Routine On-Treatment] : a routine on-treatment visit for [Breast Cancer] : breast cancer [Post-Treatment Evaluation] : post-treatment evaluation for

## 2023-12-28 NOTE — PHYSICAL EXAM
[Breast Appearance] : normal in appearance [Breast Palpation Mass] : no palpable masses [No UE Edema] : there is no upper extremity edema [Normal] : oriented to person, place and time, the affect was normal, the mood was normal and not anxious [de-identified] : Well-healed lumpectomy scar on the left side with minimal induration.  No hyperpigmentation noted.  No lymph node palpable in left axilla

## 2024-03-19 ENCOUNTER — APPOINTMENT (OUTPATIENT)
Dept: BREAST CENTER | Facility: CLINIC | Age: 84
End: 2024-03-19
Payer: MEDICARE

## 2024-03-19 VITALS — BODY MASS INDEX: 31.1 KG/M2 | WEIGHT: 210 LBS | HEIGHT: 69 IN

## 2024-03-19 DIAGNOSIS — Z92.3 PERSONAL HISTORY OF IRRADIATION: ICD-10-CM

## 2024-03-19 DIAGNOSIS — N64.1 FAT NECROSIS OF BREAST: ICD-10-CM

## 2024-03-19 DIAGNOSIS — L98.9 DISORDER OF THE SKIN AND SUBCUTANEOUS TISSUE, UNSPECIFIED: ICD-10-CM

## 2024-03-19 DIAGNOSIS — D49.2 NEOPLASM OF UNSPECIFIED BEHAVIOR OF BONE, SOFT TISSUE, AND SKIN: ICD-10-CM

## 2024-03-19 PROCEDURE — 99214 OFFICE O/P EST MOD 30 MIN: CPT

## 2024-03-19 NOTE — HISTORY OF PRESENT ILLNESS
[FreeTextEntry1] : S/P L PMX w/ NL (8/18/23): +1.1cm Ca w/ Ductal/Lob features, SBR III, +DCIS int/high grade, -LVI, -margins, ER+, MT+, Her2 2+, FISH-, Ki67: 15-20% S/P L Wide Exc Skin Tumor (11/2/18): +3mm resid adnexal skin tumor, -margins S/P L Sono Core BX (4/20/18): +prob sweat gland tumor > comlete exc rec'ed S/P R PMX w/NL/SLN (10/30/06): +MF ILCA/LCIS, -0/1 LN, +margins, ER+, MT+, Her 2 - R Stage IA ILCA S/P R Re-Exc (11/17/06): No resid Ca S/P R RT S/P Arimidex x 6 yrs S/P L Sono Core Bx (7/6/23)(L 3:00 RA): +Inv Ca (ductal/lob features), SBR II, ER+, MT+, Her2 2+, FISH: NON-Ampl, Ki67: 15-20%, +CIS (ductal/lob): high grade S/P L Stereot Bx (L LIQ)(7/6/23): Benign calc's S/P L PMX/AX/RT (1993, Abhijit) S/P tmx x 5 yrs S/P R Sono Core Bx (R 1:00, N3)(3/14/14): prob scar On eliquis for A. Fib Got Pfizer booster (10/21)(LUE) No MH/FH changes. ROS reviewed/discussed. Taking Vit D. Last Bone Densitometry (2/14): WNL Mammo/Sono (6/21/23): FG, +1cm susp mass L 3:00 RA, +pleom calc's L UOQ and L LIQ > Bx of L LIQ rec'ed

## 2024-03-19 NOTE — PHYSICAL EXAM
[Normocephalic] : normocephalic [Atraumatic] : atraumatic [Supple] : supple [No Supraclavicular Adenopathy] : no supraclavicular adenopathy [No Cervical Adenopathy] : no cervical adenopathy [No Thyromegaly] : no thyromegaly [Normal Sinus Rhythm] : normal sinus rhythm [Examined in the supine and seated position] : examined in the supine and seated position [No dominant masses] : no dominant masses left breast [No dominant masses] : no dominant masses in right breast  [No Nipple Retraction] : no left nipple retraction [No Nipple Discharge] : no left nipple discharge [No Axillary Lymphadenopathy] : no left axillary lymphadenopathy [No Edema] : no edema [No Rashes] : no rashes [No Ulceration] : no ulceration [de-identified] : S/P PMX/SLN/RT. NER. %. No lymphedema.  +R 9:00 th w/ retraction c/w fat necrosis  [de-identified] : S/P Re-PMX/Re-RT/(prior PMX/Ax/RT). NER. %. No lymphedema.  +induration/retraction

## 2024-04-09 ENCOUNTER — RESULT REVIEW (OUTPATIENT)
Age: 84
End: 2024-04-09

## 2024-04-09 ENCOUNTER — APPOINTMENT (OUTPATIENT)
Dept: HEMATOLOGY ONCOLOGY | Facility: CLINIC | Age: 84
End: 2024-04-09
Payer: MEDICARE

## 2024-04-09 VITALS
RESPIRATION RATE: 16 BRPM | HEIGHT: 69 IN | TEMPERATURE: 97.8 F | WEIGHT: 209 LBS | BODY MASS INDEX: 30.96 KG/M2 | DIASTOLIC BLOOD PRESSURE: 75 MMHG | OXYGEN SATURATION: 92 % | HEART RATE: 102 BPM | SYSTOLIC BLOOD PRESSURE: 154 MMHG

## 2024-04-09 DIAGNOSIS — C50.911 MALIGNANT NEOPLASM OF UNSPECIFIED SITE OF RIGHT FEMALE BREAST: ICD-10-CM

## 2024-04-09 DIAGNOSIS — I48.20 CHRONIC ATRIAL FIBRILLATION, UNSP: ICD-10-CM

## 2024-04-09 DIAGNOSIS — C50.912 MALIGNANT NEOPLASM OF UNSPECIFIED SITE OF RIGHT FEMALE BREAST: ICD-10-CM

## 2024-04-09 PROCEDURE — 99214 OFFICE O/P EST MOD 30 MIN: CPT

## 2024-04-09 RX ORDER — LETROZOLE TABLETS 2.5 MG/1
2.5 TABLET, FILM COATED ORAL DAILY
Qty: 90 | Refills: 3 | Status: ACTIVE | COMMUNITY
Start: 2023-11-21 | End: 1900-01-01

## 2024-04-09 NOTE — REVIEW OF SYSTEMS
[Joint Pain] : joint pain [Negative] : Allergic/Immunologic [Constipation] : constipation [FreeTextEntry8] : drop bladder [FreeTextEntry9] : +back pain

## 2024-04-09 NOTE — PHYSICAL EXAM
[Fully active, able to carry on all pre-disease performance without restriction] : Status 0 - Fully active, able to carry on all pre-disease performance without restriction [Normal] : affect appropriate [de-identified] : bilateral breasts with R and L retraction from surgeries +scar tissue, no LAD

## 2024-04-09 NOTE — HISTORY OF PRESENT ILLNESS
[de-identified] : Ms. Mata is an 83 year old female with a PMH of afib, BCCa, HTN, HLD, DM, LCIS, R breast IDC that presents for initial consultation referred by Dr. Murray for new L sided IDC.   Oncological History:   She has been followed by Dr. Murray for many years.    was diagnosed with L breast cancer followed by Dr. Lacey s/p L lumpectomy, s/p RT and 5 years of Tamoxifen    s/p R breast bx revealing multifocal LCIS with pagetoid extension into the terminal ducts    s/p L breast bx revealing non proliferative fibrocystic changes with micro calcium deposition within atrophic terminal ductules and benign lobules with no evidence for atypia or malignancy.    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  LN negatve. She was then taken back 2006 for wide excision of R breast with no residual carcinoma present LCIS present  She then completed RT and 6 years of Arimidex.   2018 surgical consultation from bx? with possible sweat gland differentiation   2018 L wide excision of skin tumor revealing eccrine origin, prominent adenoid and baseal oid features in tumor. Tumor completely circumscribed in dermins of the skin and measure 3mm in greatest dimension. All surgical lines of resection free of tumor with wide margins (>1.0cm)   2019 s/p exision for L post nasal ALA wiht BCCA s/p RT   2023 s/p mammo/sono revealing At 3:00 in the left retroareolar/periareolar region a new highly suspicious mass with associated color flow correlating with an irregular mammographic mass at 2-3 o'clock is present. Ultrasound-guided biopsy is necessary at the current time to determine histology. Fine pleomorphic microcalcifications are present at the upper outer aspect of the left breast and the inner lower aspect of the left breast. Representative stereotactic biopsy of the inner lower quadrant calcifications is recommended at the current time to determine histology and evaluate for malignancy with additional stereotactic biopsy of upper outer quadrant calcifications at a later date, if this would change clinical management.  2023 s/p L breast bx revealing invasive carcinoma with mixed ductal and lobular features involving up to 5 cores up to 11mm in single core, grade 7/9 with signet ring cells ER/TX+ HER2 equivocal negative by FISH, Ki67 15-20%   2023 s/p L lumpectomy revealing Invasive carcinoma with ductal and lobular features, histologic grade 3 (tubules 3, nuclei 3, mitoses 2) spanning up to 11 mm in greatest dimension. Rare signet ring cell features are seen. Ductal carcinoma in situ (DCIS), intermediate to high nuclear grade, solid and cribriform pattern, minor component. Perineural invasion is seen. No angiolymphatic invasion seen. Invasive carcinoma is seen at the medial edge in multiple foci and is 4 mm from the anterior edge of the specimen. ER/TX + HER2 negative Ki 67 15-20%   Breast Cancer Risk Factors: Menarche: 10 Date of LMP: age 43 Menopause: age 43  Age at first live birth: 30s Nursed: no Hysterectomy: no Oophorectomy: yes OCP: few months  HRT: no Last pap/pelvic exam: never abnl PAP, has not seen GYN in 10 years Related family history none  Ashkenazi: no BRCA testing: no   [de-identified] : Patient seen and examined today for routine follow up for the management of breast cancer.   She started on Letrozole following RT on 11/28/23.  Tolerating letrozole well with no complaints  She has been taking Ca++ and vit D.  She continues to have chronic low back pain s/p injections with Dr. Navarro.  s/p follow up with Dr. Murray  3/24- note reviewed 3/2024 CT neck and found to have blockage in carotid artery. Vascular Sx following Dr. Rasheed. Patient to have repeat scan in one yesr  DEXA 9/2023 revealing osteopenia  Mammo 8/2023 - due again 7/2/24

## 2024-04-09 NOTE — ASSESSMENT
[With Patient/Caregiver] : With Patient/Caregiver [Designated Health Care Proxy] : Designated Health Care Proxy [Name: ___] : Name: [unfilled] [Relationship: ___] : Relationship: [unfilled] [FreeTextEntry1] : #Breast Cancer - Invasive Carcinoma (ductal/lobular features), ER+, UT+, Her2 2+, FISH: NON-Amplified, Ki67: 15-20%, Patient has extensive history: Diagnosed in 1993 with breast cancer s/p L partial maastectomy, axillary dissection and radiation. Received Tamoxifen for 5 years. In 2006 developed ILC, -0/1 LN, +margins, ER+, UT+, Her 2 - s/p R PMX w SLN and needed re-excision due to positive margins. She received radiation and was on arimadex for 6 years.  - Now with Invasive Carcinoma (ductal/lobular features), ER+, UT+, Her2 2+, FISH: NON-Amplified, Ki67: 15-20%, - s/p L lumpectomy +1.1cm Ca w/ Ductal/lobular features, grade III -LVI, -margins, ER+, UT+, Her2 2+, FISH-, Ki67: 15-20% - Discussed role of oncotype - she does not want chemo thus will no order - Would recommend evaluation by Rad onc to discuss role of RT - appt Thursday with Dr. Marin - Recommend endocrine therapy to lower estrogen. Check DEXA to see if there is severe osteoporosis. If not would consider AI, letrozole 2.5 mg daily. Reviewed side effects. If there is severe osteoporosis would recommend tamoxifen again as it has been 20 + years since she has received it and I do believe she would benefit from ET. - 12/19/23 vs and CBC reviewed; WBC 11.01, hgb 13.3, plt 252. s/p completion of RT. Has follow up with Rad Onc next week for PTE. s/p follow up with Dr. Murray 12/2023, note reviewed. Started Letrozole 11/28/23 tolerating well. Continue. Monitor leukocytosis. No s/s infection  4/9/24 - vs and labs reviewed. labs drawn in office today wbc, hgb and plts within normal limits, cmp wnl except eGFR - encourage hydration. continue letrozole. reviewed note from Dr. Murray 3/24. plan for mammo/sono 7/2/24  #Back Pain  - Sees Dr. Navarro s/p injections   #Osteopenia  - s/p DEXA 9/2023 revealing L spine -0.2, L hi -1.3, L femoral necl -1.4, R hip -1.1, R femoral neck -1.0 - On Letrozole  - Ca++ and vit D   #Afib - On dilitazem and eliquis  RTC in 4 mos with CBC with diff, CMP, vit D  [AdvancecareDate] : 4/09/2024 [FreeTextEntry2] : Will bring copy

## 2024-06-18 NOTE — HISTORY OF PRESENT ILLNESS
[FreeTextEntry1] :  Ms. DYE is a 83-year-old female diagnosed with Invasive carcinoma of the left breast. She has completed 5/5 fractions to a total dose of 1800 cGy on 11/8/2023.  12/28/23  PTE today.  She is now s/p RT to the Left partial breast 5 Fxs to 30 Gy finished on 11/8/23. She had a follow up with Dr. Murray in early December and will see him again in 3/2024 with breast imaging in 6/2024. She saw Dr. Epperson on 12/18/2023 and is taking Letrozole 2.5 mg daily with no side effects.  She is overall feeling well and denies any pain, skin issues or bone pain.   6/27/24 The patient returns today for a 6 month follow up.  She saw Dr Murray on march19th and will see him again in September.  She also saw Dr epperson in april 2024 and is continuing Letrozole under her care.  Breast Imaging is due august 2024.   .

## 2024-06-18 NOTE — DISEASE MANAGEMENT
[Pathological] : TNM Stage: p [TTNM] : 1 [NTNM] : 0 [MTNM] : 0 [I] : I [de-identified] : 5/5Fx to left breast to a dose of 30Gy on 11/8/2023.

## 2024-06-27 ENCOUNTER — APPOINTMENT (OUTPATIENT)
Dept: RADIATION ONCOLOGY | Facility: CLINIC | Age: 84
End: 2024-06-27
Payer: MEDICARE

## 2024-06-27 ENCOUNTER — NON-APPOINTMENT (OUTPATIENT)
Age: 84
End: 2024-06-27

## 2024-06-27 VITALS
SYSTOLIC BLOOD PRESSURE: 141 MMHG | HEART RATE: 78 BPM | DIASTOLIC BLOOD PRESSURE: 69 MMHG | RESPIRATION RATE: 16 BRPM | OXYGEN SATURATION: 98 % | TEMPERATURE: 98 F

## 2024-06-27 DIAGNOSIS — C50.912 MALIGNANT NEOPLASM OF UNSPECIFIED SITE OF LEFT FEMALE BREAST: ICD-10-CM

## 2024-06-27 PROCEDURE — 99213 OFFICE O/P EST LOW 20 MIN: CPT

## 2024-07-01 ENCOUNTER — RESULT REVIEW (OUTPATIENT)
Age: 84
End: 2024-07-01

## 2024-08-08 ENCOUNTER — RESULT REVIEW (OUTPATIENT)
Age: 84
End: 2024-08-08

## 2024-08-08 ENCOUNTER — LABORATORY RESULT (OUTPATIENT)
Age: 84
End: 2024-08-08

## 2024-08-08 ENCOUNTER — APPOINTMENT (OUTPATIENT)
Dept: HEMATOLOGY ONCOLOGY | Facility: CLINIC | Age: 84
End: 2024-08-08

## 2024-08-08 PROCEDURE — 99214 OFFICE O/P EST MOD 30 MIN: CPT

## 2024-08-08 NOTE — REVIEW OF SYSTEMS
[Constipation] : constipation [Joint Pain] : joint pain [Negative] : Allergic/Immunologic [FreeTextEntry8] : drop bladder [FreeTextEntry9] : +back pain

## 2024-08-08 NOTE — PHYSICAL EXAM
[Fully active, able to carry on all pre-disease performance without restriction] : Status 0 - Fully active, able to carry on all pre-disease performance without restriction [Normal] : affect appropriate [de-identified] : bilateral breasts with R and L retraction from surgeries +scar tissue, no LAD

## 2024-08-08 NOTE — PHYSICAL EXAM
[Fully active, able to carry on all pre-disease performance without restriction] : Status 0 - Fully active, able to carry on all pre-disease performance without restriction [Normal] : affect appropriate [de-identified] : bilateral breasts with R and L retraction from surgeries +scar tissue, no LAD

## 2024-08-08 NOTE — HISTORY OF PRESENT ILLNESS
[de-identified] : Ms. Mata is an 83 year old female with a PMH of afib, BCCa, HTN, HLD, DM, LCIS, R breast IDC that presents for initial consultation referred by Dr. Murray for new L sided IDC.   Oncological History:   She has been followed by Dr. Murray for many years.    was diagnosed with L breast cancer followed by Dr. Lacey s/p L lumpectomy, s/p RT and 5 years of Tamoxifen    s/p R breast bx revealing multifocal LCIS with pagetoid extension into the terminal ducts    s/p L breast bx revealing non proliferative fibrocystic changes with micro calcium deposition within atrophic terminal ductules and benign lobules with no evidence for atypia or malignancy.    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  LN negatve. She was then taken back 2006 for wide excision of R breast with no residual carcinoma present LCIS present  She then completed RT and 6 years of Arimidex.   2018 surgical consultation from bx? with possible sweat gland differentiation   2018 L wide excision of skin tumor revealing eccrine origin, prominent adenoid and baseal oid features in tumor. Tumor completely circumscribed in dermins of the skin and measure 3mm in greatest dimension. All surgical lines of resection free of tumor with wide margins (>1.0cm)   2019 s/p exision for L post nasal ALA wiht BCCA s/p RT   2023 s/p mammo/sono revealing At 3:00 in the left retroareolar/periareolar region a new highly suspicious mass with associated color flow correlating with an irregular mammographic mass at 2-3 o'clock is present. Ultrasound-guided biopsy is necessary at the current time to determine histology. Fine pleomorphic microcalcifications are present at the upper outer aspect of the left breast and the inner lower aspect of the left breast. Representative stereotactic biopsy of the inner lower quadrant calcifications is recommended at the current time to determine histology and evaluate for malignancy with additional stereotactic biopsy of upper outer quadrant calcifications at a later date, if this would change clinical management.  2023 s/p L breast bx revealing invasive carcinoma with mixed ductal and lobular features involving up to 5 cores up to 11mm in single core, grade 7/9 with signet ring cells ER/WV+ HER2 equivocal negative by FISH, Ki67 15-20%   2023 s/p L lumpectomy revealing Invasive carcinoma with ductal and lobular features, histologic grade 3 (tubules 3, nuclei 3, mitoses 2) spanning up to 11 mm in greatest dimension. Rare signet ring cell features are seen. Ductal carcinoma in situ (DCIS), intermediate to high nuclear grade, solid and cribriform pattern, minor component. Perineural invasion is seen. No angiolymphatic invasion seen. Invasive carcinoma is seen at the medial edge in multiple foci and is 4 mm from the anterior edge of the specimen. ER/WV + HER2 negative Ki 67 15-20%   Breast Cancer Risk Factors: Menarche: 10 Date of LMP: age 43 Menopause: age 43  Age at first live birth: 30s Nursed: no Hysterectomy: no Oophorectomy: yes OCP: few months  HRT: no Last pap/pelvic exam: never abnl PAP, has not seen GYN in 10 years Related family history none  Ashkenazi: no BRCA testing: no   [de-identified] : Patient seen and examined today for routine follow up for the management of breast cancer.   She started on Letrozole following RT on 11/28/23.  Tolerating letrozole well with no complaints  She has been taking Ca++ and vit D.  s/p follow up with Dr. Murray  3/24- note reviewed. Seeing again in 9/24  DEXA 9/2023 revealing osteopenia  Mammo in 7/2/24 - post operative changes, no evidence of malignancy or recurrence

## 2024-08-08 NOTE — ASSESSMENT
[With Patient/Caregiver] : With Patient/Caregiver [Designated Health Care Proxy] : Designated Health Care Proxy [Name: ___] : Name: [unfilled] [Relationship: ___] : Relationship: [unfilled] [FreeTextEntry1] : #Breast Cancer - Invasive Carcinoma (ductal/lobular features), ER+, TX+, Her2 2+, FISH: NON-Amplified, Ki67: 15-20%, Patient has extensive history: Diagnosed in 1993 with breast cancer s/p L partial maastectomy, axillary dissection and radiation. Received Tamoxifen for 5 years. In 2006 developed ILC, -0/1 LN, +margins, ER+, TX+, Her 2 - s/p R PMX w SLN and needed re-excision due to positive margins. She received radiation and was on arimadex for 6 years.  - Now with Invasive Carcinoma (ductal/lobular features), ER+, TX+, Her2 2+, FISH: NON-Amplified, Ki67: 15-20%, - s/p L lumpectomy +1.1cm Ca w/ Ductal/lobular features, grade III -LVI, -margins, ER+, TX+, Her2 2+, FISH-, Ki67: 15-20% - Discussed role of oncotype - she does not want chemo thus will no order - Would recommend evaluation by Rad onc to discuss role of RT - appt Thursday with Dr. Marin - Recommend endocrine therapy to lower estrogen. Check DEXA to see if there is severe osteoporosis. If not would consider AI, letrozole 2.5 mg daily. Reviewed side effects. If there is severe osteoporosis would recommend tamoxifen again as it has been 20 + years since she has received it and I do believe she would benefit from ET. - 12/19/23 vs and CBC reviewed; WBC 11.01, hgb 13.3, plt 252. s/p completion of RT. Has follow up with Rad Onc next week for PTE. s/p follow up with Dr. Murray 12/2023, note reviewed. Started Letrozole 11/28/23 tolerating well. Continue. Monitor leukocytosis. No s/s infection  4/9/24 - vs and labs reviewed. labs drawn in office today wbc, hgb and plts within normal limits, cmp wnl except eGFR - encourage hydration. continue letrozole. reviewed note from Dr. Murray 3/24. plan for mammo/sono 7/2/24 8/24 - vs and labs reviewed. labs drawn in office today. wbc 10.25, hgb 14, plts 195. mammo/sono 7/24 reviewed - Interval postoperative and postbiopsy changes on the left. Heterogeneously dense breast tissue with no or sonographic evidence of malignancy. continue letrozole and vit D. Reviewed Dr. Marin's note from 6/24. Will be seeing Dr. Murray in 9/24.   #Back Pain  - Sees Dr. Navarro s/p injections   #Osteopenia  - s/p DEXA 9/2023 revealing L spine -0.2, L hi -1.3, L femoral necl -1.4, R hip -1.1, R femoral neck -1.0 - On Letrozole  - Ca++ and vit D   #Afib - On dilitazem and eliquis  RTC in 6 mos with CBC with diff, CMP, vit D  [AdvancecareDate] : 4/09/2024 [FreeTextEntry2] : Will bring copy

## 2024-08-08 NOTE — HISTORY OF PRESENT ILLNESS
[de-identified] : Ms. Mata is an 83 year old female with a PMH of afib, BCCa, HTN, HLD, DM, LCIS, R breast IDC that presents for initial consultation referred by Dr. Murray for new L sided IDC.   Oncological History:   She has been followed by Dr. Murray for many years.    was diagnosed with L breast cancer followed by Dr. Lacey s/p L lumpectomy, s/p RT and 5 years of Tamoxifen    s/p R breast bx revealing multifocal LCIS with pagetoid extension into the terminal ducts    s/p L breast bx revealing non proliferative fibrocystic changes with micro calcium deposition within atrophic terminal ductules and benign lobules with no evidence for atypia or malignancy.    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  LN negatve. She was then taken back 2006 for wide excision of R breast with no residual carcinoma present LCIS present  She then completed RT and 6 years of Arimidex.   2018 surgical consultation from bx? with possible sweat gland differentiation   2018 L wide excision of skin tumor revealing eccrine origin, prominent adenoid and baseal oid features in tumor. Tumor completely circumscribed in dermins of the skin and measure 3mm in greatest dimension. All surgical lines of resection free of tumor with wide margins (>1.0cm)   2019 s/p exision for L post nasal ALA wiht BCCA s/p RT   2023 s/p mammo/sono revealing At 3:00 in the left retroareolar/periareolar region a new highly suspicious mass with associated color flow correlating with an irregular mammographic mass at 2-3 o'clock is present. Ultrasound-guided biopsy is necessary at the current time to determine histology. Fine pleomorphic microcalcifications are present at the upper outer aspect of the left breast and the inner lower aspect of the left breast. Representative stereotactic biopsy of the inner lower quadrant calcifications is recommended at the current time to determine histology and evaluate for malignancy with additional stereotactic biopsy of upper outer quadrant calcifications at a later date, if this would change clinical management.  2023 s/p L breast bx revealing invasive carcinoma with mixed ductal and lobular features involving up to 5 cores up to 11mm in single core, grade 7/9 with signet ring cells ER/AR+ HER2 equivocal negative by FISH, Ki67 15-20%   2023 s/p L lumpectomy revealing Invasive carcinoma with ductal and lobular features, histologic grade 3 (tubules 3, nuclei 3, mitoses 2) spanning up to 11 mm in greatest dimension. Rare signet ring cell features are seen. Ductal carcinoma in situ (DCIS), intermediate to high nuclear grade, solid and cribriform pattern, minor component. Perineural invasion is seen. No angiolymphatic invasion seen. Invasive carcinoma is seen at the medial edge in multiple foci and is 4 mm from the anterior edge of the specimen. ER/AR + HER2 negative Ki 67 15-20%   Breast Cancer Risk Factors: Menarche: 10 Date of LMP: age 43 Menopause: age 43  Age at first live birth: 30s Nursed: no Hysterectomy: no Oophorectomy: yes OCP: few months  HRT: no Last pap/pelvic exam: never abnl PAP, has not seen GYN in 10 years Related family history none  Ashkenazi: no BRCA testing: no   [de-identified] : Patient seen and examined today for routine follow up for the management of breast cancer.   She started on Letrozole following RT on 11/28/23.  Tolerating letrozole well with no complaints  She has been taking Ca++ and vit D.  s/p follow up with Dr. Murray  3/24- note reviewed. Seeing again in 9/24  DEXA 9/2023 revealing osteopenia  Mammo in 7/2/24 - post operative changes, no evidence of malignancy or recurrence

## 2024-08-08 NOTE — ASSESSMENT
[With Patient/Caregiver] : With Patient/Caregiver [Designated Health Care Proxy] : Designated Health Care Proxy [Name: ___] : Name: [unfilled] [Relationship: ___] : Relationship: [unfilled] [FreeTextEntry1] : #Breast Cancer - Invasive Carcinoma (ductal/lobular features), ER+, RI+, Her2 2+, FISH: NON-Amplified, Ki67: 15-20%, Patient has extensive history: Diagnosed in 1993 with breast cancer s/p L partial maastectomy, axillary dissection and radiation. Received Tamoxifen for 5 years. In 2006 developed ILC, -0/1 LN, +margins, ER+, RI+, Her 2 - s/p R PMX w SLN and needed re-excision due to positive margins. She received radiation and was on arimadex for 6 years.  - Now with Invasive Carcinoma (ductal/lobular features), ER+, RI+, Her2 2+, FISH: NON-Amplified, Ki67: 15-20%, - s/p L lumpectomy +1.1cm Ca w/ Ductal/lobular features, grade III -LVI, -margins, ER+, RI+, Her2 2+, FISH-, Ki67: 15-20% - Discussed role of oncotype - she does not want chemo thus will no order - Would recommend evaluation by Rad onc to discuss role of RT - appt Thursday with Dr. Marin - Recommend endocrine therapy to lower estrogen. Check DEXA to see if there is severe osteoporosis. If not would consider AI, letrozole 2.5 mg daily. Reviewed side effects. If there is severe osteoporosis would recommend tamoxifen again as it has been 20 + years since she has received it and I do believe she would benefit from ET. - 12/19/23 vs and CBC reviewed; WBC 11.01, hgb 13.3, plt 252. s/p completion of RT. Has follow up with Rad Onc next week for PTE. s/p follow up with Dr. Murray 12/2023, note reviewed. Started Letrozole 11/28/23 tolerating well. Continue. Monitor leukocytosis. No s/s infection  4/9/24 - vs and labs reviewed. labs drawn in office today wbc, hgb and plts within normal limits, cmp wnl except eGFR - encourage hydration. continue letrozole. reviewed note from Dr. Murray 3/24. plan for mammo/sono 7/2/24 8/24 - vs and labs reviewed. labs drawn in office today. wbc 10.25, hgb 14, plts 195. mammo/sono 7/24 reviewed - Interval postoperative and postbiopsy changes on the left. Heterogeneously dense breast tissue with no or sonographic evidence of malignancy. continue letrozole and vit D. Reviewed Dr. Marin's note from 6/24. Will be seeing Dr. Murray in 9/24.   #Back Pain  - Sees Dr. Navarro s/p injections   #Osteopenia  - s/p DEXA 9/2023 revealing L spine -0.2, L hi -1.3, L femoral necl -1.4, R hip -1.1, R femoral neck -1.0 - On Letrozole  - Ca++ and vit D   #Afib - On dilitazem and eliquis  RTC in 6 mos with CBC with diff, CMP, vit D  [AdvancecareDate] : 4/09/2024 [FreeTextEntry2] : Will bring copy

## 2024-08-08 NOTE — REASON FOR VISIT
[Initial Consultation] : an initial consultation [Follow-Up Visit] : a follow-up [FreeTextEntry2] : Breast Cancer

## 2024-09-24 ENCOUNTER — APPOINTMENT (OUTPATIENT)
Dept: BREAST CENTER | Facility: CLINIC | Age: 84
End: 2024-09-24
Payer: MEDICARE

## 2024-09-24 VITALS
BODY MASS INDEX: 31.1 KG/M2 | SYSTOLIC BLOOD PRESSURE: 137 MMHG | HEIGHT: 69 IN | HEART RATE: 74 BPM | DIASTOLIC BLOOD PRESSURE: 71 MMHG | WEIGHT: 210 LBS

## 2024-09-24 DIAGNOSIS — C50.912 MALIGNANT NEOPLASM OF UNSPECIFIED SITE OF RIGHT FEMALE BREAST: ICD-10-CM

## 2024-09-24 DIAGNOSIS — N64.1 FAT NECROSIS OF BREAST: ICD-10-CM

## 2024-09-24 DIAGNOSIS — C50.911 MALIGNANT NEOPLASM OF UNSPECIFIED SITE OF RIGHT FEMALE BREAST: ICD-10-CM

## 2024-09-24 DIAGNOSIS — I48.20 CHRONIC ATRIAL FIBRILLATION, UNSP: ICD-10-CM

## 2024-09-24 DIAGNOSIS — Z92.3 PERSONAL HISTORY OF IRRADIATION: ICD-10-CM

## 2024-09-24 PROCEDURE — 99213 OFFICE O/P EST LOW 20 MIN: CPT

## 2024-09-24 NOTE — PHYSICAL EXAM
[Normocephalic] : normocephalic [Atraumatic] : atraumatic [Supple] : supple [No Supraclavicular Adenopathy] : no supraclavicular adenopathy [No Cervical Adenopathy] : no cervical adenopathy [No Thyromegaly] : no thyromegaly [Normal Sinus Rhythm] : normal sinus rhythm [Examined in the supine and seated position] : examined in the supine and seated position [No dominant masses] : no dominant masses in right breast  [No dominant masses] : no dominant masses left breast [No Nipple Retraction] : no left nipple retraction [No Nipple Discharge] : no left nipple discharge [No Axillary Lymphadenopathy] : no left axillary lymphadenopathy [No Edema] : no edema [No Rashes] : no rashes [No Ulceration] : no ulceration [de-identified] : S/P PMX/RT/(prior PMX/Ax/RT). NER. %. No lymphedema.  [de-identified] : S/P PMX/SLN/RT. NER. %. No lymphedema.  +stable fat necr R 8-9:00

## 2024-09-24 NOTE — REVIEW OF SYSTEMS
[Arthralgias] : arthralgias [Easy Bleeding] : a tendency for easy bleeding [Easy Bruising] : a tendency for easy bruising [Negative] : Endocrine

## 2024-09-24 NOTE — HISTORY OF PRESENT ILLNESS
[FreeTextEntry1] : S/P L PMX w/ NL (8/18/23): +1.1cm Ca w/ Ductal/Lob features, SBR III, +DCIS int/high grade, -LVI, -margins, ER+, FL+, Her2 2+, FISH-, Ki67: 15-20% S/P L Wide Exc Skin Tumor (11/2/18): +3mm resid adnexal skin tumor, -margins S/P L Sono Core BX (4/20/18): +prob sweat gland tumor > comlete exc rec'ed S/P R PMX w/NL/SLN (10/30/06): +MF ILCA/LCIS, -0/1 LN, +margins, ER+, FL+, Her 2 - R Stage IA ILCA S/P R Re-Exc (11/17/06): No resid Ca S/P R RT S/P Arimidex x 6 yrs S/P L Sono Core Bx (7/6/23)(L 3:00 RA): +Inv Ca (ductal/lob features), SBR II, ER+, FL+, Her2 2+, FISH: NON-Ampl, Ki67: 15-20%, +CIS (ductal/lob): high grade S/P L Stereot Bx (L LIQ)(7/6/23): Benign calc's S/P L PMX/AX/RT (1993, Abhijit) S/P tmx x 5 yrs S/P R Sono Core Bx (R 1:00, N3)(3/14/14): prob scar On eliquis for A. Fib Got Pfizer booster (10/21)(LUE) No MH/FH changes. ROS reviewed/discussed. Taking Vit D. Last Bone Densitometry (2/14): WNL Mammo/Sono (7/2/24): HD, NSF

## 2025-02-14 ENCOUNTER — APPOINTMENT (OUTPATIENT)
Dept: HEMATOLOGY ONCOLOGY | Facility: CLINIC | Age: 85
End: 2025-02-14

## 2025-04-08 ENCOUNTER — RESULT REVIEW (OUTPATIENT)
Age: 85
End: 2025-04-08

## 2025-04-08 ENCOUNTER — APPOINTMENT (OUTPATIENT)
Dept: HEMATOLOGY ONCOLOGY | Facility: CLINIC | Age: 85
End: 2025-04-08
Payer: MEDICARE

## 2025-04-08 VITALS
OXYGEN SATURATION: 95 % | TEMPERATURE: 97.4 F | HEART RATE: 80 BPM | HEIGHT: 69 IN | SYSTOLIC BLOOD PRESSURE: 136 MMHG | RESPIRATION RATE: 16 BRPM | DIASTOLIC BLOOD PRESSURE: 64 MMHG | WEIGHT: 205.6 LBS | BODY MASS INDEX: 30.45 KG/M2

## 2025-04-08 DIAGNOSIS — C50.911 MALIGNANT NEOPLASM OF UNSPECIFIED SITE OF RIGHT FEMALE BREAST: ICD-10-CM

## 2025-04-08 DIAGNOSIS — C50.912 MALIGNANT NEOPLASM OF UNSPECIFIED SITE OF RIGHT FEMALE BREAST: ICD-10-CM

## 2025-04-08 DIAGNOSIS — Z79.811 LONG TERM (CURRENT) USE OF AROMATASE INHIBITORS: ICD-10-CM

## 2025-04-08 PROCEDURE — G2211 COMPLEX E/M VISIT ADD ON: CPT

## 2025-04-08 PROCEDURE — 99214 OFFICE O/P EST MOD 30 MIN: CPT

## 2025-04-29 ENCOUNTER — APPOINTMENT (OUTPATIENT)
Dept: BREAST CENTER | Facility: CLINIC | Age: 85
End: 2025-04-29
Payer: MEDICARE

## 2025-04-29 VITALS
SYSTOLIC BLOOD PRESSURE: 127 MMHG | WEIGHT: 210 LBS | HEART RATE: 73 BPM | DIASTOLIC BLOOD PRESSURE: 72 MMHG | BODY MASS INDEX: 31.1 KG/M2 | HEIGHT: 69 IN

## 2025-04-29 DIAGNOSIS — N64.1 FAT NECROSIS OF BREAST: ICD-10-CM

## 2025-04-29 DIAGNOSIS — Z85.3 PERSONAL HISTORY OF MALIGNANT NEOPLASM OF BREAST: ICD-10-CM

## 2025-04-29 DIAGNOSIS — C50.911 MALIGNANT NEOPLASM OF UNSPECIFIED SITE OF RIGHT FEMALE BREAST: ICD-10-CM

## 2025-04-29 DIAGNOSIS — C50.912 MALIGNANT NEOPLASM OF UNSPECIFIED SITE OF RIGHT FEMALE BREAST: ICD-10-CM

## 2025-04-29 DIAGNOSIS — Z92.3 PERSONAL HISTORY OF IRRADIATION: ICD-10-CM

## 2025-04-29 DIAGNOSIS — I48.20 CHRONIC ATRIAL FIBRILLATION, UNSP: ICD-10-CM

## 2025-04-29 PROCEDURE — 99213 OFFICE O/P EST LOW 20 MIN: CPT

## 2025-06-26 ENCOUNTER — APPOINTMENT (OUTPATIENT)
Dept: RADIATION ONCOLOGY | Facility: CLINIC | Age: 85
End: 2025-06-26
Payer: MEDICARE

## 2025-06-26 VITALS
OXYGEN SATURATION: 96 % | DIASTOLIC BLOOD PRESSURE: 77 MMHG | TEMPERATURE: 98 F | HEART RATE: 100 BPM | SYSTOLIC BLOOD PRESSURE: 116 MMHG | RESPIRATION RATE: 16 BRPM

## 2025-06-26 PROCEDURE — 99212 OFFICE O/P EST SF 10 MIN: CPT

## 2025-07-08 ENCOUNTER — RESULT REVIEW (OUTPATIENT)
Age: 85
End: 2025-07-08